# Patient Record
Sex: FEMALE | URBAN - METROPOLITAN AREA
[De-identification: names, ages, dates, MRNs, and addresses within clinical notes are randomized per-mention and may not be internally consistent; named-entity substitution may affect disease eponyms.]

---

## 2019-07-11 ENCOUNTER — OFFICE VISIT (OUTPATIENT)
Dept: URBAN - METROPOLITAN AREA CLINIC 40 | Facility: CLINIC | Age: 69
End: 2019-07-11
Payer: MEDICARE

## 2019-07-11 DIAGNOSIS — E11.9 TYPE 2 DIABETES MELLITUS W/O COMPLICATION: Primary | ICD-10-CM

## 2019-07-11 PROCEDURE — 99204 OFFICE O/P NEW MOD 45 MIN: CPT | Performed by: OPTOMETRIST

## 2019-07-11 ASSESSMENT — VISUAL ACUITY
OD: 20/40
OS: 20/40

## 2019-07-11 ASSESSMENT — INTRAOCULAR PRESSURE
OS: 15
OD: 16

## 2019-07-11 ASSESSMENT — KERATOMETRY
OS: 44.13
OD: 44.75

## 2019-07-11 NOTE — IMPRESSION/PLAN
Impression: Type 2 diabetes mellitus w/o complication: L78.8. Plan: Diabetes type II: no background retinopathy, no signs of neovascularization noted. Discussed ocular and systemic benefits of blood sugar control.